# Patient Record
Sex: FEMALE | Race: WHITE | Employment: FULL TIME | URBAN - METROPOLITAN AREA
[De-identification: names, ages, dates, MRNs, and addresses within clinical notes are randomized per-mention and may not be internally consistent; named-entity substitution may affect disease eponyms.]

---

## 2024-07-15 ENCOUNTER — OFFICE VISIT (OUTPATIENT)
Dept: OBGYN CLINIC | Age: 29
End: 2024-07-15
Payer: COMMERCIAL

## 2024-07-15 VITALS
SYSTOLIC BLOOD PRESSURE: 114 MMHG | HEIGHT: 63 IN | BODY MASS INDEX: 30.12 KG/M2 | WEIGHT: 170 LBS | DIASTOLIC BLOOD PRESSURE: 78 MMHG

## 2024-07-15 DIAGNOSIS — Z31.69 ENCOUNTER FOR PRECONCEPTION CONSULTATION: Primary | ICD-10-CM

## 2024-07-15 DIAGNOSIS — F41.9 ANXIETY: ICD-10-CM

## 2024-07-15 PROCEDURE — 99203 OFFICE O/P NEW LOW 30 MIN: CPT | Performed by: OBSTETRICS & GYNECOLOGY

## 2024-07-15 RX ORDER — SERTRALINE HYDROCHLORIDE 100 MG/1
150 TABLET, FILM COATED ORAL DAILY
Qty: 45 TABLET | Refills: 11 | Status: SHIPPED | OUTPATIENT
Start: 2024-07-15

## 2024-07-15 RX ORDER — SERTRALINE HYDROCHLORIDE 100 MG/1
100 TABLET, FILM COATED ORAL DAILY
COMMUNITY
Start: 2024-02-01 | End: 2024-07-15 | Stop reason: SDUPTHER

## 2024-07-15 NOTE — PROGRESS NOTES
easily aroused.      Motor: No abnormal muscle tone.      Coordination: Coordination normal.   Psychiatric:         Mood and Affect: Mood normal.         Speech: Speech normal.         Behavior: Behavior normal. Behavior is cooperative.         Thought Content: Thought content normal.         Judgment: Judgment normal.           1. Encounter for preconception consultation    2. Anxiety      No orders of the defined types were placed in this encounter.    Orders Placed This Encounter   Medications    sertraline (ZOLOFT) 100 MG tablet     Sig: Take 1.5 tablets by mouth daily     Dispense:  45 tablet     Refill:  11     Preconception counseling discussed. Recommend daily prenatal vitamins with DHA & adequate folic acid. Start before trying to conceive so that levels are good prior to potential pregnancy. Options for genetic testing discussed. Pt taking zoloft 200mg (sertraline) mostly for anxiety and we discussed the safety of this medication and the issues surrounding past studies versus meta analysis showing that the issues mostly surrounded Paxil specifically which is also an SSRI and thus that particular one should be avoided in women who could potentially become pregnant. Ok to continue sertraline in all trimesters. Pt expressed understanding.     Reviewed pt's past GYN hx and all PMH, PSH, FH, etc.  Typical fecundity rates and options for testing if struggling to conceive discussed. Return prn or when due for annual exam or 1st pregnancy visit.     Greater than 30 minutes spent on face to face counseling, education, and examining the patient regarding her exam findings, indications for further tests, and discussion of assessment and plan as stated above.    No follow-ups on file.

## 2025-08-08 ENCOUNTER — PROCEDURE VISIT (OUTPATIENT)
Dept: OBGYN CLINIC | Age: 30
End: 2025-08-08
Payer: COMMERCIAL

## 2025-08-08 ENCOUNTER — OFFICE VISIT (OUTPATIENT)
Dept: OBGYN CLINIC | Age: 30
End: 2025-08-08

## 2025-08-08 VITALS
DIASTOLIC BLOOD PRESSURE: 74 MMHG | BODY MASS INDEX: 32.96 KG/M2 | HEIGHT: 63 IN | SYSTOLIC BLOOD PRESSURE: 118 MMHG | WEIGHT: 186 LBS

## 2025-08-08 DIAGNOSIS — Z34.01 PRIMIGRAVIDA IN FIRST TRIMESTER: ICD-10-CM

## 2025-08-08 DIAGNOSIS — N92.6 MISSED MENSES: Primary | ICD-10-CM

## 2025-08-08 DIAGNOSIS — Z3A.08 8 WEEKS GESTATION OF PREGNANCY: ICD-10-CM

## 2025-08-08 DIAGNOSIS — Z11.3 SCREEN FOR STD (SEXUALLY TRANSMITTED DISEASE): ICD-10-CM

## 2025-08-08 DIAGNOSIS — O21.9 NAUSEA AND VOMITING DURING PREGNANCY: ICD-10-CM

## 2025-08-08 LAB
ABO + RH BLD: NORMAL
BASOPHILS # BLD: 0.05 K/UL (ref 0–0.2)
BASOPHILS NFR BLD: 0.7 % (ref 0–2)
BLOOD GROUP ANTIBODIES SERPL: NORMAL
DIFFERENTIAL METHOD BLD: ABNORMAL
EOSINOPHIL # BLD: 0.22 K/UL (ref 0–0.8)
EOSINOPHIL NFR BLD: 2.9 % (ref 0.5–7.8)
ERYTHROCYTE [DISTWIDTH] IN BLOOD BY AUTOMATED COUNT: 12.4 % (ref 11.9–14.6)
EST. AVERAGE GLUCOSE BLD GHB EST-MCNC: 102 MG/DL
HBA1C MFR BLD: 5.2 % (ref 0–5.6)
HBV SURFACE AG SER QL: NONREACTIVE
HCT VFR BLD AUTO: 39.3 % (ref 35.8–46.3)
HCV AB SER QL: NONREACTIVE
HGB BLD-MCNC: 13.5 G/DL (ref 11.7–15.4)
HIV 1+2 AB+HIV1 P24 AG SERPL QL IA: NONREACTIVE
HIV 1/2 RESULT COMMENT: NORMAL
IMM GRANULOCYTES # BLD AUTO: 0.02 K/UL (ref 0–0.5)
IMM GRANULOCYTES NFR BLD AUTO: 0.3 % (ref 0–5)
LYMPHOCYTES # BLD: 1.94 K/UL (ref 0.5–4.6)
LYMPHOCYTES NFR BLD: 25.8 % (ref 13–44)
MCH RBC QN AUTO: 33.5 PG (ref 26.1–32.9)
MCHC RBC AUTO-ENTMCNC: 34.4 G/DL (ref 31.4–35)
MCV RBC AUTO: 97.5 FL (ref 82–102)
MONOCYTES # BLD: 0.83 K/UL (ref 0.1–1.3)
MONOCYTES NFR BLD: 11 % (ref 4–12)
NEUTS SEG # BLD: 4.46 K/UL (ref 1.7–8.2)
NEUTS SEG NFR BLD: 59.3 % (ref 43–78)
NRBC # BLD: 0 K/UL (ref 0–0.2)
PLATELET # BLD AUTO: 241 K/UL (ref 150–450)
PMV BLD AUTO: 10.4 FL (ref 9.4–12.3)
RBC # BLD AUTO: 4.03 M/UL (ref 4.05–5.2)
RUBV IGG SERPL IA-ACNC: 80.3 IU/ML
T PALLIDUM AB SER QL IA: NONREACTIVE
WBC # BLD AUTO: 7.5 K/UL (ref 4.3–11.1)

## 2025-08-08 PROCEDURE — 76830 TRANSVAGINAL US NON-OB: CPT | Performed by: OBSTETRICS & GYNECOLOGY

## 2025-08-08 RX ORDER — PNV NO.95/FERROUS FUM/FOLIC AC 28MG-0.8MG
TABLET ORAL
COMMUNITY

## 2025-08-08 RX ORDER — ONDANSETRON 8 MG/1
8 TABLET, ORALLY DISINTEGRATING ORAL EVERY 8 HOURS PRN
Qty: 40 TABLET | Refills: 5 | Status: SHIPPED | OUTPATIENT
Start: 2025-08-08

## 2025-08-10 LAB
BACTERIA SPEC CULT: NORMAL
SERVICE CMNT-IMP: NORMAL
VZV IGG SER IA-ACNC: NON REACTIVE

## 2025-08-11 PROBLEM — Z34.00 PRIMIGRAVIDA, ANTEPARTUM: Status: ACTIVE | Noted: 2025-08-11

## 2025-08-20 PROBLEM — O09.899 MATERNAL VARICELLA, NON-IMMUNE: Status: ACTIVE | Noted: 2025-08-20

## 2025-08-20 PROBLEM — Z28.39 MATERNAL VARICELLA, NON-IMMUNE: Status: ACTIVE | Noted: 2025-08-20

## 2025-08-25 ENCOUNTER — CLINICAL SUPPORT (OUTPATIENT)
Dept: OBGYN CLINIC | Age: 30
End: 2025-08-25

## 2025-08-25 VITALS — WEIGHT: 189 LBS | BODY MASS INDEX: 33.48 KG/M2

## 2025-08-25 DIAGNOSIS — Z82.79 FAMILY HISTORY OF CONGENITAL HEART DEFECT: ICD-10-CM

## 2025-08-25 DIAGNOSIS — Z3A.11 11 WEEKS GESTATION OF PREGNANCY: Primary | ICD-10-CM

## 2025-08-25 DIAGNOSIS — Z28.39 MATERNAL VARICELLA, NON-IMMUNE: ICD-10-CM

## 2025-08-25 DIAGNOSIS — Z34.00 PRIMIGRAVIDA, ANTEPARTUM: ICD-10-CM

## 2025-08-25 DIAGNOSIS — Z3A.11 11 WEEKS GESTATION OF PREGNANCY: ICD-10-CM

## 2025-08-25 DIAGNOSIS — O09.899 MATERNAL VARICELLA, NON-IMMUNE: ICD-10-CM

## 2025-08-25 LAB
ALBUMIN SERPL-MCNC: 3.7 G/DL (ref 3.5–5)
ALBUMIN/GLOB SERPL: 1.2 (ref 1–1.9)
ALP SERPL-CCNC: 50 U/L (ref 35–104)
ALT SERPL-CCNC: 22 U/L (ref 8–45)
ANION GAP SERPL CALC-SCNC: 13 MMOL/L (ref 7–16)
AST SERPL-CCNC: 20 U/L (ref 15–37)
BILIRUB SERPL-MCNC: <0.2 MG/DL (ref 0–1.2)
BUN SERPL-MCNC: 11 MG/DL (ref 6–23)
CALCIUM SERPL-MCNC: 9.5 MG/DL (ref 8.8–10.2)
CHLORIDE SERPL-SCNC: 102 MMOL/L (ref 98–107)
CO2 SERPL-SCNC: 22 MMOL/L (ref 20–29)
CREAT SERPL-MCNC: 0.63 MG/DL (ref 0.6–1.1)
CREAT UR-MCNC: 241 MG/DL (ref 28–217)
GLOBULIN SER CALC-MCNC: 3.1 G/DL (ref 2.3–3.5)
GLUCOSE SERPL-MCNC: 88 MG/DL (ref 70–99)
LDH SERPL L TO P-CCNC: 186 U/L (ref 127–281)
POTASSIUM SERPL-SCNC: 4 MMOL/L (ref 3.5–5.1)
PROT SERPL-MCNC: 6.8 G/DL (ref 6.3–8.2)
PROT UR-MCNC: 15 MG/DL
PROT/CREAT UR-RTO: 0.1
SODIUM SERPL-SCNC: 137 MMOL/L (ref 136–145)
URATE SERPL-MCNC: 2.5 MG/DL (ref 2.5–7.1)

## 2025-08-25 SDOH — ECONOMIC STABILITY: FOOD INSECURITY: WITHIN THE PAST 12 MONTHS, THE FOOD YOU BOUGHT JUST DIDN'T LAST AND YOU DIDN'T HAVE MONEY TO GET MORE.: NEVER TRUE

## 2025-08-25 SDOH — ECONOMIC STABILITY: FOOD INSECURITY: WITHIN THE PAST 12 MONTHS, YOU WORRIED THAT YOUR FOOD WOULD RUN OUT BEFORE YOU GOT MONEY TO BUY MORE.: NEVER TRUE

## 2025-08-26 LAB
T GONDII IGG SERPL IA-ACNC: <3 IU/ML (ref 0–7.1)
T GONDII IGM SER IA-ACNC: <3 AU/ML (ref 0–7.9)
TOXOPLASMA COMMENT: NORMAL